# Patient Record
Sex: MALE | Race: WHITE | NOT HISPANIC OR LATINO | Employment: PART TIME | URBAN - METROPOLITAN AREA
[De-identification: names, ages, dates, MRNs, and addresses within clinical notes are randomized per-mention and may not be internally consistent; named-entity substitution may affect disease eponyms.]

---

## 2024-03-03 ENCOUNTER — HOSPITAL ENCOUNTER (EMERGENCY)
Facility: HOSPITAL | Age: 19
Discharge: HOME | End: 2024-03-03
Payer: COMMERCIAL

## 2024-03-03 ENCOUNTER — APPOINTMENT (OUTPATIENT)
Dept: RADIOLOGY | Facility: HOSPITAL | Age: 19
End: 2024-03-03
Payer: COMMERCIAL

## 2024-03-03 VITALS
HEART RATE: 62 BPM | TEMPERATURE: 97.5 F | OXYGEN SATURATION: 99 % | RESPIRATION RATE: 18 BRPM | SYSTOLIC BLOOD PRESSURE: 111 MMHG | DIASTOLIC BLOOD PRESSURE: 54 MMHG

## 2024-03-03 DIAGNOSIS — M25.461 KNEE EFFUSION, RIGHT: Primary | ICD-10-CM

## 2024-03-03 LAB
BASOPHILS NFR FLD MANUAL: 0 %
BLASTS NFR FLD MANUAL: 0 %
CLARITY FLD: ABNORMAL
COLOR FLD: YELLOW
CRP SERPL-MCNC: 2.46 MG/DL
EOSINOPHIL NFR FLD MANUAL: 0 %
ERYTHROCYTE [DISTWIDTH] IN BLOOD BY AUTOMATED COUNT: 12.1 % (ref 11.5–14.5)
ERYTHROCYTE [SEDIMENTATION RATE] IN BLOOD BY WESTERGREN METHOD: 22 MM/H (ref 0–15)
HCT VFR BLD AUTO: 43.5 % (ref 41–52)
HGB BLD-MCNC: 15.2 G/DL (ref 13.5–17.5)
IMMATURE GRANULOCYTES IN FLUID: 0 %
LYMPHOCYTES NFR FLD MANUAL: 6 %
MCH RBC QN AUTO: 29.8 PG (ref 26–34)
MCHC RBC AUTO-ENTMCNC: 34.9 G/DL (ref 32–36)
MCV RBC AUTO: 85 FL (ref 80–100)
MONOS+MACROS NFR FLD MANUAL: 2 %
NEUTROPHILS NFR FLD MANUAL: 92 %
NRBC BLD-RTO: 0 /100 WBCS (ref 0–0)
OTHER CELLS NFR FLD MANUAL: 0 %
PLASMA CELLS NFR FLD MANUAL: 0 %
PLATELET # BLD AUTO: 277 X10*3/UL (ref 150–450)
RBC # BLD AUTO: 5.1 X10*6/UL (ref 4.5–5.9)
RBC # FLD AUTO: <2000 /UL
TOTAL CELLS COUNTED FLD: 100
URATE SERPL-MCNC: 5.1 MG/DL (ref 4–7.5)
WBC # BLD AUTO: 6.9 X10*3/UL (ref 4.4–11.3)
WBC # FLD AUTO: 7284 /UL

## 2024-03-03 PROCEDURE — 89060 EXAM SYNOVIAL FLUID CRYSTALS: CPT | Mod: PARLAB | Performed by: PHYSICIAN ASSISTANT

## 2024-03-03 PROCEDURE — 87070 CULTURE OTHR SPECIMN AEROBIC: CPT | Mod: PARLAB | Performed by: PHYSICIAN ASSISTANT

## 2024-03-03 PROCEDURE — 85027 COMPLETE CBC AUTOMATED: CPT | Performed by: PHYSICIAN ASSISTANT

## 2024-03-03 PROCEDURE — 2500000004 HC RX 250 GENERAL PHARMACY W/ HCPCS (ALT 636 FOR OP/ED): Performed by: PHYSICIAN ASSISTANT

## 2024-03-03 PROCEDURE — 96374 THER/PROPH/DIAG INJ IV PUSH: CPT

## 2024-03-03 PROCEDURE — 73564 X-RAY EXAM KNEE 4 OR MORE: CPT | Mod: RIGHT SIDE | Performed by: RADIOLOGY

## 2024-03-03 PROCEDURE — 85652 RBC SED RATE AUTOMATED: CPT | Performed by: PHYSICIAN ASSISTANT

## 2024-03-03 PROCEDURE — 99284 EMERGENCY DEPT VISIT MOD MDM: CPT

## 2024-03-03 PROCEDURE — 84550 ASSAY OF BLOOD/URIC ACID: CPT | Performed by: PHYSICIAN ASSISTANT

## 2024-03-03 PROCEDURE — 36415 COLL VENOUS BLD VENIPUNCTURE: CPT | Performed by: PHYSICIAN ASSISTANT

## 2024-03-03 PROCEDURE — 86140 C-REACTIVE PROTEIN: CPT | Performed by: PHYSICIAN ASSISTANT

## 2024-03-03 PROCEDURE — 73564 X-RAY EXAM KNEE 4 OR MORE: CPT | Mod: RT

## 2024-03-03 PROCEDURE — 89051 BODY FLUID CELL COUNT: CPT | Performed by: PHYSICIAN ASSISTANT

## 2024-03-03 RX ORDER — KETOROLAC TROMETHAMINE 30 MG/ML
15 INJECTION, SOLUTION INTRAMUSCULAR; INTRAVENOUS ONCE
Status: COMPLETED | OUTPATIENT
Start: 2024-03-03 | End: 2024-03-03

## 2024-03-03 RX ORDER — NAPROXEN 500 MG/1
500 TABLET ORAL
Qty: 30 TABLET | Refills: 0 | Status: SHIPPED | OUTPATIENT
Start: 2024-03-03 | End: 2024-03-18

## 2024-03-03 RX ADMIN — KETOROLAC TROMETHAMINE 15 MG: 30 INJECTION, SOLUTION INTRAMUSCULAR at 18:57

## 2024-03-03 ASSESSMENT — PAIN - FUNCTIONAL ASSESSMENT
PAIN_FUNCTIONAL_ASSESSMENT: 0-10
PAIN_FUNCTIONAL_ASSESSMENT: 0-10

## 2024-03-03 ASSESSMENT — PAIN DESCRIPTION - LOCATION: LOCATION: KNEE

## 2024-03-03 ASSESSMENT — PAIN SCALES - GENERAL
PAINLEVEL_OUTOF10: 4
PAINLEVEL_OUTOF10: 6

## 2024-03-03 ASSESSMENT — PAIN DESCRIPTION - ORIENTATION: ORIENTATION: RIGHT

## 2024-03-03 NOTE — ED TRIAGE NOTES
Patient arrives reporting intermittent right knee pain for the last 3 years. States this most recent flare up began 5 days ago. Denies any fall/injury or trauma. No previous surgeries. Has been taking ibuprofen with mild relief. Ambulatory on arrival

## 2024-03-03 NOTE — Clinical Note
Kojo Salazar was seen and treated in our emergency department on 3/3/2024.  He may return to work on 03/07/2024.       If you have any questions or concerns, please don't hesitate to call.      Rodney Swenson PA-C

## 2024-03-04 LAB — CRYSTALS FLD MICRO: NORMAL

## 2024-03-04 NOTE — ED PROVIDER NOTES
HPI   Chief Complaint   Patient presents with    Knee Pain     Patient arrives reporting intermittent right knee pain for the last 3 years. States this most recent flare up began 5 days ago. Denies any fall/injury or trauma. No previous surgeries. Has been taking ibuprofen with mild relief. Ambulatory on arrival        Otherwise healthy 18-year-old male presents today complaining of atraumatic right knee pain and swelling.  The patient states intermittent symptoms for the past few years.  He reports the symptoms in particular began 5 days ago.  He reports decreased range of motion due to the swelling and pain.  He denies any fevers.  No reports of erythema or warmth.  Denies any weakness or paresthesias throughout his right lower extremity.  Denies any personal history of gout.  He denies any history of IV drug use or immunocompromising disease.  Denies any history of joint replacement surgery.                          No data recorded                   Patient History   No past medical history on file.  No past surgical history on file.  No family history on file.  Social History     Tobacco Use    Smoking status: Not on file    Smokeless tobacco: Not on file   Substance Use Topics    Alcohol use: Not on file    Drug use: Not on file       Physical Exam   ED Triage Vitals   Temperature Heart Rate Respirations BP   03/03/24 1805 03/03/24 1806 03/03/24 1805 03/03/24 1805   36.4 °C (97.5 °F) 87 14 113/68      Pulse Ox Temp src Heart Rate Source Patient Position   03/03/24 1805 -- -- 03/03/24 1949   97 %   Sitting      BP Location FiO2 (%)     03/03/24 1949 --     Left arm        Physical Exam  Vitals and nursing note reviewed.   Constitutional:       General: He is not in acute distress.     Appearance: Normal appearance. He is normal weight. He is not ill-appearing, toxic-appearing or diaphoretic.   HENT:      Head: Normocephalic.      Nose: Nose normal.      Mouth/Throat:      Mouth: Mucous membranes are moist.    Eyes:      Extraocular Movements: Extraocular movements intact.      Conjunctiva/sclera: Conjunctivae normal.   Cardiovascular:      Rate and Rhythm: Normal rate and regular rhythm.      Pulses: Normal pulses.   Pulmonary:      Effort: Pulmonary effort is normal. No respiratory distress.      Breath sounds: Normal breath sounds.   Musculoskeletal:      Cervical back: Normal range of motion and neck supple.      Comments: Patient has a moderately sized right suprapatellar effusion with tenderness on palpation throughout the anterior aspect of the knee.  The compartments of the right lower extremity remain soft.  Patient has brisk cap refill and easily palpable distal pulses.  Patient's range of motion is decreased to about 30 degrees with flexion of the right knee.   Skin:     General: Skin is warm and dry.      Capillary Refill: Capillary refill takes less than 2 seconds.   Neurological:      General: No focal deficit present.      Mental Status: He is alert and oriented to person, place, and time.   Psychiatric:         Mood and Affect: Mood normal.         Behavior: Behavior normal.         Thought Content: Thought content normal.         Judgment: Judgment normal.         ED Course & MDM   ED Course as of 03/03/24 2119   Sun Mar 03, 2024   2059 C-Reactive Protein(!): 2.46 [DS]   2059 Sed Rate(!): 22 [DS]   2059 WBC: 6.9 [DS]   2059 URIC ACID: 5.1 [DS]   2100 IMPRESSION:  1. No acute osseous abnormality identified.  2. Moderate-to-large suprapatellar joint effusion with mild  prepatellar soft tissue edema. Correlate for injury or evidence of  infection, including septic joint.   [DS]   2118 Fluid cell count with differential revealed no evidence [DS]      ED Course User Index  [DS] Rodney Swenson PA-C         Diagnoses as of 03/03/24 2119   Knee effusion, right       Medical Decision Making  Discussed differentials at length.  Blood work was obtained revealing a slight elevation of the CRP at 2.46.   Sedimentation rate 22.  No evidence of leukocytosis.  Radiographs revealed no acute osseous abnormality however the patient was found to have a moderate to large suprapatellar joint effusion which is consistent with physical exam.  There is no erythema or warmth on exam.  Patient is afebrile.  He does have decreased range of motion.  Discussed joint aspiration with the patient.  Discussed risk versus benefits.  Patient was agreeable to the procedure and verbalized understanding of the plan.  Verbal consent was provided by the patient.  Total of 60 cc of cloudy straw-colored fluid was aspirated without difficulty in a sterile fashion.  Patient endorsed marked improvement after having the fluid aspirated.  He was provided with Toradol.  Cell count was obtained.  Cell count was without concerning abnormality.  Patient was notified of the findings including the differentials.  Patient will be instructed to follow-up with the assigned orthopedic service for reassessment in the next few days.  He was given crutches along with crutch training.  An Ace wrap was applied.  Recommended frequent use of ice and elevation.  Return precautions were discussed.  Do not suspect septic arthritis however I did consider this in my differential.        Procedure  Procedures     Rodney Swenson PA-C  03/03/24 2120

## 2024-03-04 NOTE — DISCHARGE INSTRUCTIONS
Apply compression with an Ace wrap to your right knee to help disperse the swelling.  You should be using ice frequently.  Use the crutches and avoid weightbearing if possible.  Follow-up with the assigned orthopedic service in the next few days for reassessment

## 2024-03-06 LAB
BACTERIA FLD CULT: NORMAL
GRAM STN SPEC: NORMAL
GRAM STN SPEC: NORMAL

## 2024-09-30 ENCOUNTER — APPOINTMENT (OUTPATIENT)
Dept: RADIOLOGY | Facility: HOSPITAL | Age: 19
End: 2024-09-30
Payer: COMMERCIAL

## 2024-09-30 ENCOUNTER — HOSPITAL ENCOUNTER (EMERGENCY)
Facility: HOSPITAL | Age: 19
Discharge: HOME | End: 2024-09-30
Attending: EMERGENCY MEDICINE
Payer: COMMERCIAL

## 2024-09-30 VITALS
OXYGEN SATURATION: 99 % | DIASTOLIC BLOOD PRESSURE: 77 MMHG | TEMPERATURE: 97.2 F | SYSTOLIC BLOOD PRESSURE: 132 MMHG | HEART RATE: 67 BPM | RESPIRATION RATE: 18 BRPM | BODY MASS INDEX: 22.53 KG/M2 | HEIGHT: 73 IN | WEIGHT: 170 LBS

## 2024-09-30 DIAGNOSIS — M25.521 RIGHT ELBOW PAIN: Primary | ICD-10-CM

## 2024-09-30 PROCEDURE — 73080 X-RAY EXAM OF ELBOW: CPT | Mod: RT

## 2024-09-30 PROCEDURE — 99283 EMERGENCY DEPT VISIT LOW MDM: CPT

## 2024-09-30 PROCEDURE — 73080 X-RAY EXAM OF ELBOW: CPT | Mod: RIGHT SIDE | Performed by: RADIOLOGY

## 2024-09-30 ASSESSMENT — LIFESTYLE VARIABLES
HAVE PEOPLE ANNOYED YOU BY CRITICIZING YOUR DRINKING: NO
HAVE YOU EVER FELT YOU SHOULD CUT DOWN ON YOUR DRINKING: NO
EVER HAD A DRINK FIRST THING IN THE MORNING TO STEADY YOUR NERVES TO GET RID OF A HANGOVER: NO
TOTAL SCORE: 0
EVER FELT BAD OR GUILTY ABOUT YOUR DRINKING: NO

## 2024-09-30 ASSESSMENT — PAIN SCALES - GENERAL: PAINLEVEL_OUTOF10: 4

## 2024-09-30 ASSESSMENT — PAIN DESCRIPTION - PAIN TYPE: TYPE: ACUTE PAIN

## 2024-09-30 ASSESSMENT — COLUMBIA-SUICIDE SEVERITY RATING SCALE - C-SSRS
1. IN THE PAST MONTH, HAVE YOU WISHED YOU WERE DEAD OR WISHED YOU COULD GO TO SLEEP AND NOT WAKE UP?: NO
2. HAVE YOU ACTUALLY HAD ANY THOUGHTS OF KILLING YOURSELF?: NO
6. HAVE YOU EVER DONE ANYTHING, STARTED TO DO ANYTHING, OR PREPARED TO DO ANYTHING TO END YOUR LIFE?: NO

## 2024-09-30 ASSESSMENT — PAIN DESCRIPTION - LOCATION: LOCATION: ELBOW

## 2024-09-30 ASSESSMENT — PAIN - FUNCTIONAL ASSESSMENT: PAIN_FUNCTIONAL_ASSESSMENT: 0-10
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
## 2024-09-30 NOTE — DISCHARGE INSTRUCTIONS
You were seen in the ED for right elbow injury 3wk ago that has not fully healed.  Xray did not show any broken or dislocated bones.  Please follow up with orthopedics who can monitor your arm as it heals and treat as necessary.  Return to the ED for severe pain or other concerns.

## 2024-10-01 ENCOUNTER — OFFICE VISIT (OUTPATIENT)
Dept: ORTHOPEDIC SURGERY | Facility: CLINIC | Age: 19
End: 2024-10-01
Payer: COMMERCIAL

## 2024-10-01 DIAGNOSIS — M77.11 RIGHT LATERAL EPICONDYLITIS: Primary | ICD-10-CM

## 2024-10-01 DIAGNOSIS — M25.521 RIGHT ELBOW PAIN: ICD-10-CM

## 2024-10-01 PROCEDURE — 99203 OFFICE O/P NEW LOW 30 MIN: CPT

## 2024-10-01 PROCEDURE — 99213 OFFICE O/P EST LOW 20 MIN: CPT
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
## 2024-12-10 ENCOUNTER — OFFICE VISIT (OUTPATIENT)
Dept: ORTHOPEDIC SURGERY | Facility: CLINIC | Age: 19
End: 2024-12-10
Payer: COMMERCIAL

## 2024-12-10 DIAGNOSIS — M25.561 ACUTE PAIN OF RIGHT KNEE: ICD-10-CM

## 2024-12-10 DIAGNOSIS — M25.461 EFFUSION OF RIGHT KNEE: Primary | ICD-10-CM

## 2024-12-10 LAB
BASOPHILS NFR FLD MANUAL: 0 %
BLASTS NFR FLD MANUAL: 0 %
CLARITY FLD: ABNORMAL
COLOR FLD: ABNORMAL
CRYSTALS FLD MICRO: NORMAL
EOSINOPHIL NFR FLD MANUAL: 0 %
IMMATURE GRANULOCYTES IN FLUID: 0 %
LYMPHOCYTES NFR FLD MANUAL: 3 %
MONOS+MACROS NFR FLD MANUAL: 8 %
NEUTROPHILS NFR FLD MANUAL: 89 %
OTHER CELLS NFR FLD MANUAL: 0 %
PLASMA CELLS NFR FLD MANUAL: 0 %
RBC # FLD AUTO: 4000 /UL
TOTAL CELLS COUNTED SNV: 100
WBC # FLD AUTO: ABNORMAL /UL

## 2024-12-10 PROCEDURE — 89051 BODY FLUID CELL COUNT: CPT | Mod: PARLAB

## 2024-12-10 PROCEDURE — 20610 DRAIN/INJ JOINT/BURSA W/O US: CPT | Mod: RT

## 2024-12-10 PROCEDURE — 87205 SMEAR GRAM STAIN: CPT

## 2024-12-10 PROCEDURE — 99213 OFFICE O/P EST LOW 20 MIN: CPT

## 2024-12-10 PROCEDURE — 2500000004 HC RX 250 GENERAL PHARMACY W/ HCPCS (ALT 636 FOR OP/ED)

## 2024-12-10 PROCEDURE — 89060 EXAM SYNOVIAL FLUID CRYSTALS: CPT

## 2024-12-10 RX ORDER — LIDOCAINE HYDROCHLORIDE 20 MG/ML
2 INJECTION, SOLUTION INFILTRATION; PERINEURAL
Status: COMPLETED | OUTPATIENT
Start: 2024-12-10 | End: 2024-12-10

## 2024-12-10 NOTE — PROGRESS NOTES
Subjective    Patient ID: Vidal Salazar is a 19 y.o. male.    Chief Complaint: Follow-up of the Right Knee (X4 YRS/NKI)     HPI  This is a pleasant 19-year-old male presenting to the office for follow-up of right knee pain and swelling. Patient has been seen for a similar situation in September 2024 at [facility] emergency department.  He had an atraumatic effusion at that time, which was aspirated and sent for testing.  Crystal exam was negative.  Uric acid was normal.  He had slightly elevated CRP and sed rate, but normal white blood count.  Also white blood cell count and tested fluid was 7000.  He was advised to monitor symptoms and follow-up as needed.  Presents to the office today stating that right knee has become swollen again atraumatically.  He denies any recent fever or chills.  He denies any redness to his knee, but has noticed slight warmth.  He has noticed a significant amount of swelling, which causes limited range of motion.  States that pain is minimal.  He has been using a compression sleeve and taking Tylenol.  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    The patient's past medical, surgical, family, and social history as well as allergies and medications were reviewed and updated in the chart.  Patient denies history of gout.    Objective   Ortho Exam  Pleasant and no acute distress. Walks with a mildly antalgic gait.  Right knee appearing without soft tissue swelling erythema or ecchymosis.  There is slight warmth upon touch, no erythema noted.  There is a moderate effusion.  Right knee range of motion is approximately 0 to 120 degrees with discomfort due to swelling.  The knee is stable to varus and valgus stress.  No open wounds or abrasions noted.  Right lower extremity is well-perfused and skin is intact.    Assessment/Plan   Encounter Diagnoses:  Effusion of right knee    Acute pain of right knee    Plan: Discussion with patient in regards to atraumatic right knee  effusion.  After the risks and benefits of a right knee aspiration was discussed, patient agreed to aspiration and tolerated well.  Approximately 80 cc of slightly clouded yellow fluid was easily aspirated from knee.  I have sent fluid for crystal exam, cell counts, and cultures.  Low suspicion for septic knee.  Most likely gout.  Patient was advised that he should establish care with a primary care physician for maintenance of gout.  He can continue with compression sleeve and Tylenol.  If fluid comes back with crystals positive, I will most likely prescribe patient a Medrol Dosepak and colchicine.  He can follow-up as needed.    L Inj/Asp: R knee on 12/10/2024 4:23 PM  Indications: joint swelling  Details: 18 G needle, superolateral approach  Medications: 2 mL lidocaine 20 mg/mL (2 %)  Aspirate: 80 mL cloudy and yellow; sent for lab analysis  Outcome: tolerated well, no immediate complications  Procedure, treatment alternatives, risks and benefits explained, specific risks discussed. Consent was given by the patient.

## 2024-12-13 LAB
BACTERIA FLD CULT: NORMAL
GRAM STN SPEC: NORMAL
GRAM STN SPEC: NORMAL